# Patient Record
Sex: FEMALE | Race: BLACK OR AFRICAN AMERICAN | Employment: FULL TIME | URBAN - METROPOLITAN AREA
[De-identification: names, ages, dates, MRNs, and addresses within clinical notes are randomized per-mention and may not be internally consistent; named-entity substitution may affect disease eponyms.]

---

## 2023-10-03 ENCOUNTER — TELEPHONE (OUTPATIENT)
Dept: OBGYN | Age: 23
End: 2023-10-03

## 2024-05-16 ENCOUNTER — HOSPITAL ENCOUNTER (EMERGENCY)
Age: 24
Discharge: HOME | End: 2024-05-16
Payer: COMMERCIAL

## 2024-05-16 VITALS
TEMPERATURE: 98.2 F | WEIGHT: 88 LBS | OXYGEN SATURATION: 100 % | RESPIRATION RATE: 18 BRPM | DIASTOLIC BLOOD PRESSURE: 90 MMHG | HEIGHT: 58 IN | SYSTOLIC BLOOD PRESSURE: 116 MMHG | HEART RATE: 77 BPM | BODY MASS INDEX: 18.47 KG/M2

## 2024-05-16 DIAGNOSIS — J02.9 ACUTE PHARYNGITIS, UNSPECIFIED ETIOLOGY: Primary | ICD-10-CM

## 2024-05-16 DIAGNOSIS — R09.81 NASAL CONGESTION: ICD-10-CM

## 2024-05-16 LAB
SARS-COV-2 RDRP RESP QL NAA+PROBE: NOT DETECTED
SPECIMEN DESCRIPTION: NORMAL
SPECIMEN SOURCE: NORMAL
STREP A, MOLECULAR: NEGATIVE

## 2024-05-16 PROCEDURE — 99283 EMERGENCY DEPT VISIT LOW MDM: CPT

## 2024-05-16 PROCEDURE — 87635 SARS-COV-2 COVID-19 AMP PRB: CPT

## 2024-05-16 PROCEDURE — 6370000000 HC RX 637 (ALT 250 FOR IP): Performed by: NURSE PRACTITIONER

## 2024-05-16 PROCEDURE — 87651 STREP A DNA AMP PROBE: CPT

## 2024-05-16 RX ORDER — GUAIFENESIN 1200 MG/1
1 TABLET, EXTENDED RELEASE ORAL 2 TIMES DAILY PRN
Qty: 14 TABLET | Refills: 0 | Status: SHIPPED | OUTPATIENT
Start: 2024-05-16 | End: 2024-05-23

## 2024-05-16 RX ORDER — IBUPROFEN 600 MG/1
600 TABLET ORAL ONCE
Status: COMPLETED | OUTPATIENT
Start: 2024-05-16 | End: 2024-05-16

## 2024-05-16 RX ORDER — IBUPROFEN 600 MG/1
600 TABLET ORAL EVERY 6 HOURS PRN
Qty: 20 TABLET | Refills: 0 | Status: SHIPPED | OUTPATIENT
Start: 2024-05-16 | End: 2024-05-21

## 2024-05-16 RX ADMIN — IBUPROFEN 600 MG: 600 TABLET, FILM COATED ORAL at 13:02

## 2024-05-16 ASSESSMENT — PAIN DESCRIPTION - DESCRIPTORS: DESCRIPTORS: SORE

## 2024-05-16 ASSESSMENT — LIFESTYLE VARIABLES
HOW MANY STANDARD DRINKS CONTAINING ALCOHOL DO YOU HAVE ON A TYPICAL DAY: PATIENT DOES NOT DRINK
HOW OFTEN DO YOU HAVE A DRINK CONTAINING ALCOHOL: NEVER

## 2024-05-16 ASSESSMENT — PAIN DESCRIPTION - LOCATION: LOCATION: THROAT

## 2024-05-16 ASSESSMENT — PAIN SCALES - GENERAL: PAINLEVEL_OUTOF10: 6

## 2024-05-16 NOTE — DISCHARGE INSTRUCTIONS
it may be harder to treat if antibiotics are used when they are not needed. Not treating with antibiotics allows our own immune system to develop and take care of infections more efficiently. Also, antibiotics will work better for us when they are prescribed for bacterial infections.  Treatments for a child that is ill may include:  Give extra fluids throughout the day to stay hydrated.  Get plenty of rest.  Only give your child over-the-counter or prescription medicines for pain, discomfort, or fever as directed by your caregiver.  The use of a cool mist humidifier may help stuffy noses.  Cold medications if suggested by your caregiver.  Your caregiver may decide to start you on an antibiotic if:  The problem you were seen for today continues for a longer length of time than expected.  You develop a secondary bacterial infection.  SEEK MEDICAL CARE IF:  Fever lasts longer than 5 days.  Symptoms continue to get worse after 5 to 7 days or become severe.  Difficulty in breathing develops.  Signs of dehydration develop (poor drinking, rare urinating, dark colored urine).  Changes in behavior or worsening tiredness (listlessness or lethargy).

## 2024-05-16 NOTE — ED PROVIDER NOTES
will be given a prescription for Mucinex, miracle mouthwash and Motrin does not have a history of GI bleed advised to take with food to avoid stomach upset.  They will follow-up with their PMD if no improvement.         Plan of Care: Normal progression of disease discussed.  All questions answered.  Explained the rationale for symptomatic treatment rather than use of an antibiotic.  Instruction provided in the use of fluids, vaporizer, acetaminophen, and other OTC medication for symptom control.  Extra fluids  Analgesics as needed, dosages and times reviewed.  Follow up as needed should symptoms fail to improve.     History from : Patient    Limitations to history : None    Discussion with Other Professionals : None           Assessment     1. Acute pharyngitis, unspecified etiology    2. Nasal congestion      Plan   Discharged home.  Patient condition is good    New Medications     Discharge Medication List as of 5/16/2024  1:55 PM        START taking these medications    Details   guaiFENesin (MUCINEX MAXIMUM STRENGTH) 1200 MG TB12 Take 1 tablet by mouth 2 times daily as needed (Congestion), Disp-14 tablet, R-0Normal      ibuprofen (ADVIL;MOTRIN) 600 MG tablet Take 1 tablet by mouth every 6 hours as needed for Pain, Disp-20 tablet, R-0Normal      Magic Mouthwash (MIRACLE MOUTHWASH) Swish and spit 5 mLs 4 times daily as needed for Irritation, Disp-240 mL, R-0Normal           Electronically signed by KRANTHI Mariscal CNP   DD: 5/16/24  **This report was transcribed using voice recognition software. Every effort was made to ensure accuracy; however, inadvertent computerized transcription errors may be present.  END OF ED PROVIDER NOTE      Lisette Doran APRN - CNP  05/16/24 [id number]

## 2024-05-16 NOTE — DISCHARGE INSTR - COC
BM: ***  No intake or output data in the 24 hours ending 24 1358  No intake/output data recorded.    Safety Concerns:     { SIM Safety Concerns:034298650}    Impairments/Disabilities:      { SIM Impairments/Disabilities:612316172}    Nutrition Therapy:  Current Nutrition Therapy:   { SIM Diet List:668089105}    Routes of Feeding: {CHP DME Other Feedings:187408042}  Liquids: {Slp liquid thickness:92159}  Daily Fluid Restriction: {CHP DME Yes amt example:724578206}  Last Modified Barium Swallow with Video (Video Swallowing Test): {Done Not Done Date:}    Treatments at the Time of Hospital Discharge:   Respiratory Treatments: ***  Oxygen Therapy:  {Therapy; copd oxygen:51497}  Ventilator:    { CC Vent List:090155718}    Rehab Therapies: {THERAPEUTIC INTERVENTION:3705400700}  Weight Bearing Status/Restrictions: {[facility] Weight Bearin}  Other Medical Equipment (for information only, NOT a DME order):  {EQUIPMENT:537105970}  Other Treatments: ***    Patient's personal belongings (please select all that are sent with patient):  {[facility] DME Belongings:977917110}    RN SIGNATURE:  {Esignature:925206266}    CASE MANAGEMENT/SOCIAL WORK SECTION    Inpatient Status Date: ***    Readmission Risk Assessment Score:  Readmission Risk              Risk of Unplanned Readmission:  0           Discharging to Facility/ Agency   Name:   Address:  Phone:  Fax:    Dialysis Facility (if applicable)   Name:  Address:  Dialysis Schedule:  Phone:  Fax:    / signature: {Esignature:055771570}    PHYSICIAN SECTION    Prognosis: {Prognosis:3385731079}    Condition at Discharge: { Patient Condition:454172612}    Rehab Potential (if transferring to Rehab): {Prognosis:3799237549}    Recommended Labs or Other Treatments After Discharge: ***    Physician Certification: I certify the above information and transfer of Deirdre Bryant  is necessary for the continuing treatment of the diagnosis listed and